# Patient Record
Sex: MALE | Race: OTHER | Employment: UNEMPLOYED | URBAN - METROPOLITAN AREA
[De-identification: names, ages, dates, MRNs, and addresses within clinical notes are randomized per-mention and may not be internally consistent; named-entity substitution may affect disease eponyms.]

---

## 2017-01-13 ENCOUNTER — CLINICAL DOCUMENTATION (OUTPATIENT)
Dept: PHYSICAL THERAPY | Age: 32
End: 2017-01-13

## 2017-04-23 ENCOUNTER — HOSPITAL ENCOUNTER (OUTPATIENT)
Dept: MRI IMAGING | Age: 32
Discharge: HOME | End: 2017-04-23
Payer: COMMERCIAL

## 2017-04-23 DIAGNOSIS — M65.332 TRIGGER MIDDLE FINGER OF LEFT HAND: ICD-10-CM

## 2017-04-23 PROCEDURE — 73218 MRI UPPER EXTREMITY W/O DYE: CPT

## 2020-09-21 ENCOUNTER — OFFICE VISIT (OUTPATIENT)
Dept: NEUROLOGY | Age: 35
End: 2020-09-21
Payer: COMMERCIAL

## 2020-09-21 VITALS — SYSTOLIC BLOOD PRESSURE: 144 MMHG | DIASTOLIC BLOOD PRESSURE: 89 MMHG | HEART RATE: 91 BPM

## 2020-09-21 DIAGNOSIS — R26.0 ATAXIC GAIT: ICD-10-CM

## 2020-09-21 PROBLEM — G89.29 CHRONIC BILATERAL THORACIC BACK PAIN: Status: ACTIVE | Noted: 2020-09-21

## 2020-09-21 PROBLEM — R20.2 PARESTHESIAS: Status: ACTIVE | Noted: 2020-09-21

## 2020-09-21 PROBLEM — R25.2 FOOT SPASMS: Status: ACTIVE | Noted: 2020-09-21

## 2020-09-21 PROBLEM — M54.6 CHRONIC BILATERAL THORACIC BACK PAIN: Status: ACTIVE | Noted: 2020-09-21

## 2020-09-21 LAB
FOLATE: >20 NG/ML (ref 7.3–26.1)
VITAMIN B-12: 435 PG/ML (ref 232–1245)

## 2020-09-21 PROCEDURE — G8421 BMI NOT CALCULATED: HCPCS | Performed by: PSYCHIATRY & NEUROLOGY

## 2020-09-21 PROCEDURE — 4004F PT TOBACCO SCREEN RCVD TLK: CPT | Performed by: PSYCHIATRY & NEUROLOGY

## 2020-09-21 PROCEDURE — G8427 DOCREV CUR MEDS BY ELIG CLIN: HCPCS | Performed by: PSYCHIATRY & NEUROLOGY

## 2020-09-21 PROCEDURE — 99204 OFFICE O/P NEW MOD 45 MIN: CPT | Performed by: PSYCHIATRY & NEUROLOGY

## 2020-09-21 RX ORDER — CYCLOBENZAPRINE HCL 10 MG
10 TABLET ORAL NIGHTLY
COMMUNITY
Start: 2020-07-29

## 2020-09-21 ASSESSMENT — ENCOUNTER SYMPTOMS
NAUSEA: 0
BACK PAIN: 1
COLOR CHANGE: 0
TROUBLE SWALLOWING: 0
VOMITING: 0
CHOKING: 0
SHORTNESS OF BREATH: 0
PHOTOPHOBIA: 0

## 2020-09-21 NOTE — PROGRESS NOTES
Not on file     Non-medical: Not on file   Tobacco Use    Smoking status: Current Every Day Smoker    Smokeless tobacco: Never Used   Substance and Sexual Activity    Alcohol use: Not on file    Drug use: Not on file    Sexual activity: Not on file   Lifestyle    Physical activity     Days per week: Not on file     Minutes per session: Not on file    Stress: Not on file   Relationships    Social connections     Talks on phone: Not on file     Gets together: Not on file     Attends Buddhism service: Not on file     Active member of club or organization: Not on file     Attends meetings of clubs or organizations: Not on file     Relationship status: Not on file    Intimate partner violence     Fear of current or ex partner: Not on file     Emotionally abused: Not on file     Physically abused: Not on file     Forced sexual activity: Not on file   Other Topics Concern    Not on file   Social History Narrative    Not on file     No family history on file. No Known Allergies    Current Outpatient Medications   Medication Sig Dispense Refill    cyclobenzaprine (FLEXERIL) 10 MG tablet Take 10 mg by mouth nightly       No current facility-administered medications for this visit. Review of Systems   Constitutional: Negative for fever. HENT: Negative for ear pain, tinnitus and trouble swallowing. Eyes: Negative for photophobia and visual disturbance. Respiratory: Negative for choking and shortness of breath. Cardiovascular: Negative for chest pain and palpitations. Gastrointestinal: Negative for nausea and vomiting. Musculoskeletal: Positive for back pain, gait problem and joint swelling. Negative for myalgias, neck pain and neck stiffness. Skin: Negative for color change. Allergic/Immunologic: Negative for food allergies. Neurological: Positive for weakness.  Negative for dizziness, tremors, seizures, syncope, facial asymmetry, speech difficulty, light-headedness, numbness and headaches. Psychiatric/Behavioral: Negative for behavioral problems, confusion, hallucinations and sleep disturbance. Objective:   BP (!) 144/89 (Site: Right Upper Arm, Position: Sitting, Cuff Size: Medium Adult)   Pulse 91     Physical Exam  Vitals signs reviewed. Eyes:      Pupils: Pupils are equal, round, and reactive to light. Neck:      Musculoskeletal: Normal range of motion. Cardiovascular:      Rate and Rhythm: Normal rate and regular rhythm. Heart sounds: No murmur. Pulmonary:      Effort: Pulmonary effort is normal.      Breath sounds: Normal breath sounds. Abdominal:      General: Bowel sounds are normal.   Musculoskeletal: Normal range of motion. Skin:     General: Skin is warm. Neurological:      Mental Status: He is alert and oriented to person, place, and time. Cranial Nerves: No cranial nerve deficit. Sensory: No sensory deficit. Motor: No abnormal muscle tone. Coordination: Coordination normal.      Deep Tendon Reflexes: Reflexes are normal and symmetric. Babinski sign absent on the right side. Babinski sign absent on the left side. Psychiatric:         Mood and Affect: Mood normal.       Patient is somewhat hyperreflexic in his lower extremity compared to upper extremity there is no clonus. He is equivocal toes. Upon standing he has some spasms. Mild proximal weakness of 4+ of 5 is notable. Mri Hand Left Wo Contrast    Result Date: 4/24/2017  EXAMINATION: MRI HAND LEFT WO CONTRAST: CLINICAL HISTORY: M65.332 TRIGGER MIDDLE FINGER OF LEFT HAND ICD10. COMPARISONS: None available. TECHNIQUE: MRI of the left hand was obtained, with specific attention to the left middle finger. A marker was placed at the site of patient's symptoms. COMPARISON: There are no X-rays available for comparison.  FINDINGS: There is a large amount of soft tissue swelling attributable to synovitis surrounding the proximal interphalangeal joint, especially along its volar surface. There is soft tissue swelling and T2 hyperintensity surrounding the flexor tendon complex at the level of the proximal interphalangeal joint. There is marrow space edema affecting the head and neck of the proximal phalanx of the left middle finger. The adjacent base of the middle phalanx is virtually unaffected. Articular surfaces appear to be intact, without evidence of disruption. There are erosions at the synovial attachment sites at the proximal phalanx head margins. There is no similar reactive marrow edema associated with other joints within the field-of-view. The extensor complex is less affected than the flexor complex, best appreciated on axial, fat saturation T2 images. There is no intrinsic abnormality of the tendon complex. There is no discernible mass. CONCLUSION: ABUNDANT INFLAMMATORY CHANGES ASSOCIATED WITH THE PROXIMAL INTERPHALANGEAL JOINT. THERE IS REACTIVE EDEMA IN THE MARROW SPACE OF THE DISTAL END OF THE PROXIMAL PHALANX OF THE MIDDLE FINGER. THE FLEXOR TENDON COMPLEX IS DISPLACED BY THE SOFT TISSUE SWELLING AND PARTIALLY ENCASED BY EDEMA. THIS WOULD BE AN UNUSUAL MANIFESTATION OF INFLAMMATORY ARTHROPATHY, SINCE REMAINING JOINTS ARE UNAFFECTED. DIFFERENTIAL DIAGNOSIS INCLUDES ATYPICAL INFLAMMATORY ARTHROPATHY. INFECTION IS UNLIKELY. INJURY IS  UNLIKELY. CORRELATION WITH X-RAY MIGHT BE HELPFUL.       No results found for: WBC, RBC, HGB, HCT, MCV, MCH, MCHC, RDW, PLT, MPV  No results found for: NA, K, CL, CO2, BUN, CREATININE, GFRAA, AGRATIO, LABGLOM, GLUCOSE, PROT, LABALBU, CALCIUM, BILITOT, ALKPHOS, AST, ALT  No results found for: PROTIME, INR  No results found for: TSH, [id number], FOLATE, FERRITIN, IRON, TIBC, PTRFSAT, TSH, FREET4  No results found for: TRIG, HDL, LDLCALC, LDLDIRECT, LABVLDL  No results found for: LABAMPH, BARBSCNU, LABBENZ, CANNAB, COCAINESCRN, LABMETH, OPIATESCREENURINE, PHENCYCLIDINESCREENURINE, PPXUR, ETOH  No results found for: LITHIUM, DILFRTOT, VALPROATE    Assessment:       Diagnosis Orders   1. Ataxic gait  MRI BRAIN W WO CONTRAST    MRI THORACIC SPINE W WO CONTRAST    MRI LUMBAR SPINE W WO CONTRAST    Vitamin B12 & Folate    Htlv I/Ii [place] Blot    Zinc    Copper, Serum    Heavy Metals, Blood   2. Paresthesias     3. Foot spasms     4. Chronic bilateral thoracic back pain     Slowly progressive gait ataxia with spasm in his lower extremities. Patient's findings are suggestive  of an upper motor neuron type of pathology and in this age group demyelination seen multiple sclerosis must be ruled out. The other etiologies to be considered his myelopathy is seen B12 deficiencies as well as HTLV-1 and HTLV 2. There is no family of spastic paraparesis in the family. Patient retains all his reflexes in fact somewhat hyperreflexic in the lower extremity and therefore a pathology in the thoracic spine was to be considered. MRI of the brain thoracic spine and lumbosacral spine strongly recommended for lower extremity weakness.     Depending on the results of the same will further advise        Plan:      Orders Placed This Encounter   Procedures    MRI BRAIN W WO CONTRAST     Standing Status:   Future     Standing Expiration Date:   9/21/2021     Order Specific Question:   Reason for exam:     Answer:   r/o MS    MRI THORACIC SPINE W WO CONTRAST     Standing Status:   Future     Standing Expiration Date:   9/21/2021     Order Specific Question:   Reason for exam:     Answer:   r/o ms    MRI LUMBAR SPINE W WO CONTRAST     Standing Status:   Future     Standing Expiration Date:   9/21/2021     Order Specific Question:   Reason for exam:     Answer:   lumbar stenoisis    Vitamin B12 & Folate     Standing Status:   Future     Standing Expiration Date:   9/21/2021    Htlv I/Ii [facility] Blot     Standing Status:   Future     Standing Expiration Date:   9/21/2021    Zinc     Standing Status:   Future     Standing Expiration Date:   9/21/2021    Copper, Serum Standing Status:   Future     Standing Expiration Date:   9/21/2021    Heavy Metals, Blood     Standing Status:   Future     Standing Expiration Date:   9/21/2021     No orders of the defined types were placed in this encounter. Return in about 3 months (around 12/21/2020).       Vibha Moscoso MD

## 2020-09-23 LAB
COPPER: 94.5 UG/DL (ref 70–140)
ZINC: 80 UG/DL (ref 60–120)

## 2020-09-24 LAB — Lab: ABNORMAL

## 2020-09-25 LAB
ARSENIC BLOOD: <10 UG/L
LEAD LEVEL BLOOD: <2 UG/DL
MERCURY BLOOD: <2.5 UG/L

## 2020-10-17 ENCOUNTER — HOSPITAL ENCOUNTER (OUTPATIENT)
Dept: MRI IMAGING | Age: 35
Discharge: HOME | End: 2020-10-19
Payer: COMMERCIAL

## 2020-10-17 PROCEDURE — 70553 MRI BRAIN STEM W/O & W/DYE: CPT

## 2020-10-17 PROCEDURE — 6360000004 HC RX CONTRAST MEDICATION: Performed by: PSYCHIATRY & NEUROLOGY

## 2020-10-17 PROCEDURE — 72157 MRI CHEST SPINE W/O & W/DYE: CPT

## 2020-10-17 PROCEDURE — A9579 GAD-BASE MR CONTRAST NOS,1ML: HCPCS | Performed by: PSYCHIATRY & NEUROLOGY

## 2020-10-17 PROCEDURE — 72158 MRI LUMBAR SPINE W/O & W/DYE: CPT

## 2020-10-17 RX ADMIN — GADOTERIDOL 15 ML: 279.3 INJECTION, SOLUTION INTRAVENOUS at 11:46

## 2020-10-19 ENCOUNTER — TELEPHONE (OUTPATIENT)
Dept: NEUROLOGY | Age: 35
End: 2020-10-19

## 2020-10-19 NOTE — TELEPHONE ENCOUNTER
Pt called due to results showing on MyChart and he was questioning what all they mean and if they are okay. Could you please let us know so we can give him a call back. Thank You.

## 2020-10-20 NOTE — TELEPHONE ENCOUNTER
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

## 2020-10-26 ENCOUNTER — TELEPHONE (OUTPATIENT)
Dept: NEUROLOGY | Age: 35
End: 2020-10-26

## 2020-10-26 NOTE — TELEPHONE ENCOUNTER
----- Message from Chas Hu MD sent at 10/25/2020  1:44 PM EDT -----  MRI of the brain cervical spine and thoracic spine are normal.  Patient does have an indeterminate HTLV-1 titers which will be repeated in 3 months.

## 2020-12-24 PROBLEM — M79.605 PAIN OF LEFT LOWER EXTREMITY: Status: ACTIVE | Noted: 2018-05-10

## 2020-12-24 PROBLEM — M79.604 PAIN OF RIGHT LOWER EXTREMITY: Status: ACTIVE | Noted: 2018-05-10

## 2020-12-30 ENCOUNTER — OFFICE VISIT (OUTPATIENT)
Dept: NEUROLOGY | Age: 35
End: 2020-12-30
Payer: COMMERCIAL

## 2020-12-30 VITALS — DIASTOLIC BLOOD PRESSURE: 88 MMHG | HEART RATE: 87 BPM | SYSTOLIC BLOOD PRESSURE: 136 MMHG

## 2020-12-30 DIAGNOSIS — Z22.6 HTLV-1 CARRIER: ICD-10-CM

## 2020-12-30 PROCEDURE — G8421 BMI NOT CALCULATED: HCPCS | Performed by: PSYCHIATRY & NEUROLOGY

## 2020-12-30 PROCEDURE — 99214 OFFICE O/P EST MOD 30 MIN: CPT | Performed by: PSYCHIATRY & NEUROLOGY

## 2020-12-30 PROCEDURE — G8484 FLU IMMUNIZE NO ADMIN: HCPCS | Performed by: PSYCHIATRY & NEUROLOGY

## 2020-12-30 PROCEDURE — G8427 DOCREV CUR MEDS BY ELIG CLIN: HCPCS | Performed by: PSYCHIATRY & NEUROLOGY

## 2020-12-30 PROCEDURE — 4004F PT TOBACCO SCREEN RCVD TLK: CPT | Performed by: PSYCHIATRY & NEUROLOGY

## 2020-12-30 ASSESSMENT — ENCOUNTER SYMPTOMS
SHORTNESS OF BREATH: 0
CHOKING: 0
TROUBLE SWALLOWING: 0
BACK PAIN: 1
PHOTOPHOBIA: 0
VOMITING: 0
NAUSEA: 0
COLOR CHANGE: 0

## 2020-12-30 NOTE — PROGRESS NOTES
Subjective:      Patient ID: June Del Toro is a 38 y.o. male who presents today for:  Chief Complaint   Patient presents with    Follow-up     PT states things aren't great still pretty much going the same as before.  Results     PT Had blood work and an MRI would like to go over results today. HPI 58-year-old right-handed female with a history of lower extremity weakness going on for 2 years. Patient has slowly worsened over time with spasms and paresthesias. Patient did not complain of any bladder dysfunction. Is walking on crutches for the past 2 years. His MRI of the brain thoracic and lumbosacral spine are normal.  His HTLV-1 and HTLV 2 shows indeterminate finding all the other findings are normal.  Truly has spasticity in the lower extremities hyperreflexic in lower extremity compared to the upper extremity. he still walking with crutches. No past medical history on file. No past surgical history on file.   Social History     Socioeconomic History    Marital status:      Spouse name: Not on file    Number of children: Not on file    Years of education: Not on file    Highest education level: Not on file   Occupational History    Not on file   Social Needs    Financial resource strain: Not on file    Food insecurity     Worry: Not on file     Inability: Not on file    Transportation needs     Medical: Not on file     Non-medical: Not on file   Tobacco Use    Smoking status: Current Every Day Smoker    Smokeless tobacco: Never Used   Substance and Sexual Activity    Alcohol use: Not on file    Drug use: Not on file    Sexual activity: Not on file   Lifestyle    Physical activity     Days per week: Not on file     Minutes per session: Not on file    Stress: Not on file   Relationships    Social connections     Talks on phone: Not on file     Gets together: Not on file     Attends Religion service: Not on file     Active member of club or organization: Not on file Attends meetings of clubs or organizations: Not on file     Relationship status: Not on file    Intimate partner violence     Fear of current or ex partner: Not on file     Emotionally abused: Not on file     Physically abused: Not on file     Forced sexual activity: Not on file   Other Topics Concern    Not on file   Social History Narrative    Not on file     No family history on file. No Known Allergies    Current Outpatient Medications   Medication Sig Dispense Refill    cyclobenzaprine (FLEXERIL) 10 MG tablet Take 10 mg by mouth nightly       No current facility-administered medications for this visit. Review of Systems   Constitutional: Negative for fever. HENT: Negative for ear pain, tinnitus and trouble swallowing. Eyes: Negative for photophobia and visual disturbance. Respiratory: Negative for choking and shortness of breath. Cardiovascular: Negative for chest pain and palpitations. Gastrointestinal: Negative for nausea and vomiting. Musculoskeletal: Positive for arthralgias and back pain. Negative for gait problem, joint swelling, myalgias, neck pain and neck stiffness. Skin: Negative for color change. Allergic/Immunologic: Negative for food allergies. Neurological: Positive for weakness. Negative for dizziness, tremors, seizures, syncope, facial asymmetry, speech difficulty, light-headedness, numbness and headaches. Psychiatric/Behavioral: Negative for behavioral problems, confusion, hallucinations and sleep disturbance. Objective:   /88 (Site: Left Upper Arm, Position: Sitting, Cuff Size: Medium Adult)   Pulse 87     Physical Exam  Vitals signs reviewed. Eyes:      Pupils: Pupils are equal, round, and reactive to light. Neck:      Musculoskeletal: Normal range of motion. Cardiovascular:      Rate and Rhythm: Normal rate and regular rhythm. Heart sounds: No murmur.    Pulmonary:      Effort: Pulmonary effort is normal.      Breath sounds: Normal breath sounds. Abdominal:      General: Bowel sounds are normal.   Musculoskeletal: Normal range of motion. Skin:     General: Skin is warm. Neurological:      Mental Status: He is alert and oriented to person, place, and time. Cranial Nerves: No cranial nerve deficit. Sensory: No sensory deficit. Motor: No abnormal muscle tone. Coordination: Coordination normal.      Deep Tendon Reflexes: Babinski sign absent on the right side. Babinski sign absent on the left side. Reflex Scores:       Tricep reflexes are 2+ on the right side and 2+ on the left side. Bicep reflexes are 2+ on the right side and 2+ on the left side. Brachioradialis reflexes are 2+ on the right side and 2+ on the left side. Patellar reflexes are 3+ on the right side and 3+ on the left side. Achilles reflexes are 3+ on the right side and 3+ on the left side. Comments: Patient this patient has weakness in the lower extremity of 4+ of 5 but probably definitely has hyperreflexia in the lower extremity compared to the upper extremities and walks with the crutches. Psychiatric:         Mood and Affect: Mood normal.         Mri Thoracic Spine W Wo Contrast    Result Date: 10/19/2020  MRI LUMBAR AND THORACIC SPINE WITHOUT AND WITH CONTRAST HISTORY: Ataxic gait COMPARISON: No prior imaging of the lumbar spine available for correlation. TECHNIQUE: Sagittal T1, T2, and inversion recovery. Axial T1 and T2. Sagittal and axial T1 post IV gadolinium with fat suppression (15 cc of ProHance). Sagittal T1 whole spine localizer. 3 plane T2 lumbar localizer. 3 plane gradient echo thoracic localized. FINDINGS: LUMBAR SPINE. Axial image quality limited by motion. For the purposes of enumerating the lumbar disc levels the lowest lumbar-type disc will be referred to as L5-S1. Vertebral body heights and alignment is maintained. No spondylolysis.  15 mm rounded well-defined focus of increased T1, T2, and inversion recovery signal, having mild postcontrast enhancement compatible with hemangioma. No pathologic marrow replacement. Conus medullaris is unremarkable terminating just above the L1-2 disc. No abnormal intraspinal enhancement. T11-12 through the L5-S1 discs have no significant degenerative changes with well-maintained height and degree of hydration. L3-4 and L4-5 disc levels have a canal at the lower limits of normal in size more pronounced at the former. This is considered on a congenital basis accentuated by slight loss of the posterior disc concavity and minimal ligamentous hypertrophy. No foraminal stenosis. L5-S1 disc subtle eccentric towards the right bulging without significant mass effect on the thecal sac. Liver is partly included on the localizer and the portions visualized measure over 19 cm in craniocaudal, either hepatomegaly or Riedel's lobe. IMPRESSION (lumbar spine): Essentially unremarkable MRI of the lumbar spine. L3-4 and to lesser extent L4-5 minimal canal narrowing predominantly on a congenital basis. THORACIC SPINE: Axial image quality is limited by motion. Vertebral body heights and alignment is maintained. No compression fractures or pathologic marrow replacement. No abnormal vertebral or intraspinal enhancement. Thoracic cord has a normal signal and morphology. T6-7 mild disc space narrowing and desiccation with a tiny central shallow focal disc protrusion causing minimal mass effect fact on the thecal sac. T8-7 trivial left subarticular shallow bulging. No canal or foraminal stenosis at these levels or elsewhere. T4-5 mild and minimal T3-4 anterior endplate spurring. Lower cervical cord apparent increased signal on the inferior edge of the sagittal T2 sequence. No axial images through this region on thoracic spine exam. This could represent partial volume averaging with cerebrospinal fluid lateral to the cord or true  signal abnormality.  Cervical cord to the mid C3 level is visible on MRI of the brain and unremarkable. Right lung apex on the axial T2 sequence 7.7 x 5.3 mm soft tissue signal focus. Differential includes partial volume averaging of the superior pleura as this is on the most superior image, nodule or artifact. Likely artifact lateral left apex. IMPRESSION (thoracic spine): Essentially unremarkable MRI of the thoracic spine. Inferior cervical cord apparent increased signal detailed above. Correlate with cervical spine MRI. Right lung apex nodule, partial volume averaging or artifact. Correlate with CT chest.    Mri Lumbar Spine W Wo Contrast    Result Date: 10/19/2020  MRI LUMBAR AND THORACIC SPINE WITHOUT AND WITH CONTRAST HISTORY: Ataxic gait COMPARISON: No prior imaging of the lumbar spine available for correlation. TECHNIQUE: Sagittal T1, T2, and inversion recovery. Axial T1 and T2. Sagittal and axial T1 post IV gadolinium with fat suppression (15 cc of ProHance). Sagittal T1 whole spine localizer. 3 plane T2 lumbar localizer. 3 plane gradient echo thoracic localized. FINDINGS: LUMBAR SPINE. Axial image quality limited by motion. For the purposes of enumerating the lumbar disc levels the lowest lumbar-type disc will be referred to as L5-S1. Vertebral body heights and alignment is maintained. No spondylolysis. 15 mm rounded well-defined focus of increased T1, T2, and inversion recovery signal, having mild postcontrast enhancement compatible with hemangioma. No pathologic marrow replacement. Conus medullaris is unremarkable terminating just above the L1-2 disc. No abnormal intraspinal enhancement. T11-12 through the L5-S1 discs have no significant degenerative changes with well-maintained height and degree of hydration. L3-4 and L4-5 disc levels have a canal at the lower limits of normal in size more pronounced at the former. This is considered on a congenital basis accentuated by slight loss of the posterior disc concavity and minimal ligamentous hypertrophy. No foraminal stenosis.  L5-S1 with IV contrast. HISTORY: Ataxic gait. COMPARISON: No prior imaging of the brain available for correlation. TECHNIQUE: Sagittal T1. Axial T1, T2, susceptibility weighted and diffusion-weighted images. Coronal T2. Sagittal axial and coronal thin section FLAIR. Axial and coronal T1 after the IV administration of 15 cc of gadolinium (ProHance). Thin section 3-D spoiled gradient post gadolinium with coronal and sagittal reconstructions. FINDINGS: Brain has a normal morphology with no mass, extra-axial collections or abnormal postcontrast enhancement. Subtle nonspecific white matter increased long TR sequence signal adjacent to the occipital and frontal horns is a common finding that may represent a normal histologic variant (e.g. ependymitis granularis adjacent to the frontal horns) or age-related. No abnormal signal typical for primary demyelinating disease. No significant atrophy or ventricular dilatation. Specifically no cerebellar atrophy. No restricted diffusion to indicate acute infarct. No encephalomalacia to indicate remote infarcts. No pathologic mineralization on the susceptibility weighted images. Orbits, pituitary gland, internal auditory canals, and cerebellopontine angles are unremarkable. No cerebellar tonsillar ectopia. Cervical cord to the midportion of the C3 vertebra is included on the sagittal FLAIR and has a normal signal and morphology. Mild to moderate bilateral ethmoid sinus mucosal thickening. Mild frontal and maxillary sinus mucosal thickening. No air-fluid levels to indicate acute sinusitis. Subtle faint nonspecific mucosal thickening in the mastoid sinuses without fluid bright signal.     Essentially unremarkable MRI of the brain. Mild chronic paranasal sinus mucosal thickening most pronounced in the ethmoids.       No results found for: WBC, RBC, HGB, HCT, MCV, MCH, MCHC, RDW, PLT, MPV  No results found for: NA, K, CL, CO2, BUN, CREATININE, GFRAA, AGRATIO, LABGLOM, GLUCOSE, PROT, LABALBU, CALCIUM, BILITOT, ALKPHOS, AST, ALT  No results found for: PROTIME, INR  Lab Results   Component Value Date    [id number] 656 09/21/2020    FOLATE >20.0 09/21/2020     No results found for: TRIG, HDL, LDLCALC, LDLDIRECT, LABVLDL  No results found for: LABAMPH, BARBSCNU, LABBENZ, CANNAB, COCAINESCRN, LABMETH, OPIATESCREENURINE, PHENCYCLIDINESCREENURINE, PPXUR, ETOH  No results found for: LITHIUM, DILFRTOT, VALPROATE    Assessment:       Diagnosis Orders   1. 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 Paresthesias  EMG   4. Foot spasms     Gait ataxia with lower extremity spasticity and weakness. MRI of the brain and thoracic and lumbosacral spine are normal.  Patient symptoms appear to be lower extremity with spasticity and increased reflexes. Definite is a upper motor neuron type of findings. We did obtain his HTLV-1 levels and they are indeterminate we need to repeat this with antibody titers. This is the first differential diagnosis. The etiology is to be considered are spinal muscular atrophy and therefore we will arrange for an EMG nerve conduction study to see if there is denervation in the face of hyper reflexia at this time they qualify for SMA which now has a treatment    On rare occasions and NMO  spectral disorders seen in multiple sclerosis is still a consideration without white matter disease we will consider lumbar puncture at some point. We will arrange for VDRL studies as well as Lyme titers.   He does not have B12 myelopathy          Plan:      Orders Placed This Encounter   Procedures    Htlv I/Ii [place] Blot     Standing Status:   Future     Standing Expiration Date:   12/30/2021    Htlv-I/Ii Antibodies With Confirm     Standing Status:   Future     Standing Expiration Date:   12/30/2021    Vdrl W Reflex To Titer     Standing Status:   Future     Standing Expiration Date: 12/30/2021    Lyme Disease Acute Reflexive Panel     Standing Status:   Future     Standing Expiration Date:   12/30/2021    EMG     Standing Status:   Future     Standing Expiration Date:   2/28/2021     Order Specific Question:   Which body part? Answer:   lower/  pn     No orders of the defined types were placed in this encounter. Return in about 3 months (around 3/30/2021).       Eladia El MD

## 2020-12-31 LAB — HTLV I/II AB: NEGATIVE

## 2021-01-01 LAB
LYME, EIA: 0.45 LIV (ref 0–1.2)
T. PALLIDIUM  SERUM (VDRL): NON REACTIVE

## 2021-01-27 ENCOUNTER — HOSPITAL ENCOUNTER (OUTPATIENT)
Dept: NEUROLOGY | Age: 36
Discharge: HOME | End: 2021-01-27
Payer: COMMERCIAL

## 2021-01-27 DIAGNOSIS — R20.2 PARESTHESIAS: ICD-10-CM

## 2021-01-27 PROCEDURE — 95912 NRV CNDJ TEST 11-12 STUDIES: CPT

## 2021-01-27 PROCEDURE — 95912 NRV CNDJ TEST 11-12 STUDIES: CPT | Performed by: PSYCHIATRY & NEUROLOGY

## 2021-01-27 PROCEDURE — 95886 MUSC TEST DONE W/N TEST COMP: CPT

## 2021-01-27 PROCEDURE — 95886 MUSC TEST DONE W/N TEST COMP: CPT | Performed by: PSYCHIATRY & NEUROLOGY

## 2021-01-27 NOTE — PROCEDURES
[address]                      [facility], [address] [facility]                             ELECTROMYOGRAM REPORT    PATIENT NAME: Alonzo Walsh                       :        1985  MED REC NO:   [id number]                            ROOM:  ACCOUNT NO:   [de-identified]                           ADMIT DATE: 2021  PROVIDER:     Governor Milton MD    DATE OF EM2021    Neurophysiological studies are requested for the patient's lower  extremity weakness and spasticity. Motor nerve conduction study of the right common peroneal nerve shows  normal amplitudes, latencies, and conduction velocity. Motor nerve  conduction study of the left common peroneal nerve shows normal  amplitudes, latencies, and conduction velocity. Motor nerve conduction  study of the right tibial nerve shows normal amplitudes, latencies, and  conduction velocity. Motor nerve conduction study of the left tibial  nerve shows normal amplitudes, latencies, and conduction velocity. Right sural nerve conduction study shows normal peak latencies, normal  amplitudes, and normal conduction velocity. Left sural nerve conduction  study shows normal peak latencies, normal amplitudes, and normal  conduction velocity. Right superficial peroneal nerve shows normal  amplitudes, latencies, and conduction velocity. Left superficial  peroneal shows normal amplitudes, latencies, and conduction velocity. Right saphenous nerve shows normal peak latencies, normal amplitudes,  and normal conduction velocity. Left saphenous nerve shows normal peak  latencies, low amplitudes, and normal conduction velocity. Needle  electrode examination is entirely normal.  The quadriceps muscle on the  right cannot be examined due to fixed knee. Needle electrode examination otherwise is normal.  F-responses are  normal.  H-responses are normal.    IMPRESSION:  Normal nerve conduction studies of the lower extremity.

## 2021-03-31 ENCOUNTER — OFFICE VISIT (OUTPATIENT)
Dept: NEUROLOGY | Age: 36
End: 2021-03-31
Payer: COMMERCIAL

## 2021-03-31 VITALS — DIASTOLIC BLOOD PRESSURE: 73 MMHG | HEART RATE: 86 BPM | SYSTOLIC BLOOD PRESSURE: 133 MMHG

## 2021-03-31 DIAGNOSIS — R25.2 SPASTICITY: ICD-10-CM

## 2021-03-31 DIAGNOSIS — R26.0 ATAXIC GAIT: ICD-10-CM

## 2021-03-31 DIAGNOSIS — G24.8 LIMB DYSTONIA: ICD-10-CM

## 2021-03-31 DIAGNOSIS — G89.29 CHRONIC RIGHT-SIDED LOW BACK PAIN WITH RIGHT-SIDED SCIATICA: ICD-10-CM

## 2021-03-31 DIAGNOSIS — M54.41 CHRONIC RIGHT-SIDED LOW BACK PAIN WITH RIGHT-SIDED SCIATICA: ICD-10-CM

## 2021-03-31 DIAGNOSIS — R25.2 FOOT SPASMS: ICD-10-CM

## 2021-03-31 DIAGNOSIS — G82.20 PARAPARESIS, BILATERAL (HCC): Primary | ICD-10-CM

## 2021-03-31 PROCEDURE — G8427 DOCREV CUR MEDS BY ELIG CLIN: HCPCS | Performed by: PSYCHIATRY & NEUROLOGY

## 2021-03-31 PROCEDURE — G8421 BMI NOT CALCULATED: HCPCS | Performed by: PSYCHIATRY & NEUROLOGY

## 2021-03-31 PROCEDURE — 4004F PT TOBACCO SCREEN RCVD TLK: CPT | Performed by: PSYCHIATRY & NEUROLOGY

## 2021-03-31 PROCEDURE — G8484 FLU IMMUNIZE NO ADMIN: HCPCS | Performed by: PSYCHIATRY & NEUROLOGY

## 2021-03-31 PROCEDURE — 99214 OFFICE O/P EST MOD 30 MIN: CPT | Performed by: PSYCHIATRY & NEUROLOGY

## 2021-03-31 ASSESSMENT — ENCOUNTER SYMPTOMS
CHOKING: 0
NAUSEA: 0
SHORTNESS OF BREATH: 0
PHOTOPHOBIA: 0
TROUBLE SWALLOWING: 0
VOMITING: 0
COLOR CHANGE: 0
BACK PAIN: 0

## 2021-03-31 NOTE — PROGRESS NOTES
Subjective:      Patient ID: Jony Barnes is a 38 y.o. male who presents today for:  Chief Complaint   Patient presents with    Follow-up     PT states that everything is the same. He states that he would like to go over results from the emg today. HPI 69-year-old right-handed gentleman who was here for additional lower extremity weakness going on for 2 years. Patient slowly progressive weakness. He had no symptoms in his upper extremity. He was diagnosed with rheumatic fever in 2013 with knee issues. We had further embarked upon multiple evaluations which are completed. Initial H DL B1 was indeterminate we are further repeated this with antibodies and these are negative this does not therefore suggest a HTLV 1 or 2 spastic paraparesis. Patient is Lyme titers are negative as well. Patient's MRI of the brain thoracic and lumbar spine are normal.  he still has spasticity in the lower extremity and walks with crutches. EMG nerve conduction studies are normal there was some question about spinal muscular atrophy. Findings did not suggest this though. 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 The other differential diagnosis would have been an MO spectrum disorders though he has no evidence of any white matter disease anywhere else. Patient's heavy metal screen also is negative. She has considerable hamstring spasm due to his knee pain from previous knee infections. He is walking on crutches      No past medical history on file. No past surgical history on file.   Social History     Socioeconomic History    Marital status:      Spouse name: Not on file    Number of children: Not on file    Years of education: Not on file    Highest education level: Not on file   Occupational History    Not on file   Social Needs    Financial resource strain: Not on file  Food insecurity     Worry: Not on file     Inability: Not on file    Transportation needs     Medical: Not on file     Non-medical: Not on file   Tobacco Use    Smoking status: Current Every Day Smoker    Smokeless tobacco: Never Used   Substance and Sexual Activity    Alcohol use: Not on file    Drug use: Not on file    Sexual activity: Not on file   Lifestyle    Physical activity     Days per week: Not on file     Minutes per session: Not on file    Stress: Not on file   Relationships    Social connections     Talks on phone: Not on file     Gets together: Not on file     Attends [employer] service: Not on file     Active member of club or organization: Not on file     Attends meetings of clubs or organizations: Not on file     Relationship status: Not on file    Intimate partner violence     Fear of current or ex partner: Not on file     Emotionally abused: Not on file     Physically abused: Not on file     Forced sexual activity: Not on file   Other Topics Concern    Not on file   Social History Narrative    Not on file     No family history on file. No Known Allergies    Current Outpatient Medications   Medication Sig Dispense Refill    cyclobenzaprine (FLEXERIL) 10 MG tablet Take 10 mg by mouth nightly       No current facility-administered medications for this visit. Review of Systems   Constitutional: Negative for fever. HENT: Negative for ear pain, tinnitus and trouble swallowing. Eyes: Negative for photophobia and visual disturbance. Respiratory: Negative for choking and shortness of breath. Cardiovascular: Negative for chest pain and palpitations. Gastrointestinal: Negative for nausea and vomiting. Musculoskeletal: Negative for back pain, gait problem, joint swelling, myalgias, neck pain and neck stiffness. Skin: Negative for color change. Allergic/Immunologic: Negative for food allergies.    Neurological: Negative for dizziness, tremors, seizures, syncope, facial asymmetry, speech difficulty, weakness, light-headedness, numbness and headaches. Psychiatric/Behavioral: Negative for behavioral problems, confusion, hallucinations and sleep disturbance. Objective:   /73 (Site: Left Upper Arm, Position: Sitting, Cuff Size: Medium Adult)   Pulse 86     Physical Exam  Vitals signs reviewed. Eyes:      Pupils: Pupils are equal, round, and reactive to light. Neck:      Musculoskeletal: Normal range of motion. Cardiovascular:      Rate and Rhythm: Normal rate and regular rhythm. Heart sounds: No murmur. Pulmonary:      Effort: Pulmonary effort is normal.      Breath sounds: Normal breath sounds. Abdominal:      General: Bowel sounds are normal.   Musculoskeletal: Normal range of motion. Skin:     General: Skin is warm. Neurological:      Mental Status: He is alert and oriented to person, place, and time. Cranial Nerves: No cranial nerve deficit. Sensory: No sensory deficit. Motor: No abnormal muscle tone. Coordination: Coordination normal.      Deep Tendon Reflexes: Reflexes are normal and symmetric. Babinski sign absent on the right side. Babinski sign absent on the left side. Psychiatric:         Mood and Affect: Mood normal.         No results found. No results found for: WBC, RBC, HGB, HCT, MCV, MCH, MCHC, RDW, PLT, MPV  No results found for: NA, K, CL, CO2, BUN, CREATININE, GFRAA, AGRATIO, LABGLOM, GLUCOSE, PROT, LABALBU, CALCIUM, BILITOT, ALKPHOS, AST, ALT  No results found for: PROTIME, INR  Lab Results   Component Value Date    [id number] 09/21/2020    FOLATE >20.0 09/21/2020     No results found for: TRIG, HDL, LDLCALC, LDLDIRECT, LABVLDL  No results found for: Rizwana Flicker, LABBENZ, CANNAB, COCAINESCRN, LABMETH, OPIATESCREENURINE, PHENCYCLIDINESCREENURINE, PPXUR, ETOH  No results found for: LITHIUM, DILFRTOT, VALPROATE    Assessment:       Diagnosis Orders   1. Paraparesis, bilateral ([address]     2. Chronic right-sided low back pain with right-sided sciatica     3. Ataxic gait     4. Foot spasms     5. Spasticity     Paraparesis with extensive work-up. Patient has some minor spasticity in lower extremity and therefore we embarked upon a complete evaluation including MRI of the lumbar spine thoracic spine MRI of the brain EMG and multiple labs his initial HTLV-1 came back as partially positive repeated this is negative and this test therefore may not suggest spastic paraparesis. I truly feel all his symptoms are related to his knee and he has spasms in his hamstrings we will treat this with 20 units about amatoxin to see if he can relieve his pain and spasms which may help his therapy. He had any infection which is left him with all this and underlying reflex intact dystrophy is a consideration. Patient has back issues but his MRI lumbar spine did not show anything significant. We are only short of her muscle biopsy and we had discussed this if we have to do this though his muscle parameters are all normal. This therefore is in unidentified problem secondary to us knee problem and I do not think that this is spinal muscular atrophy given his findings from the EMG as well. 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 No orders of the defined types were placed in this encounter. No follow-ups on file.       Chris Cohen MD

## 2021-04-06 ENCOUNTER — TELEPHONE (OUTPATIENT)
Dept: NEUROLOGY | Age: 36
End: 2021-04-06

## 2021-04-06 DIAGNOSIS — G24.8 TORSION DYSTONIA, ACQUIRED: Primary | ICD-10-CM

## 2021-04-06 NOTE — TELEPHONE ENCOUNTER
Botox approved and scheduled for 05/05/21 at 1015 am at [facility]. Mailbox full. Order pended. Please sign.

## 2021-04-06 NOTE — TELEPHONE ENCOUNTER
PT called back and was informed of approval and appointment time. He asked that if there are any cancellations that he could get in sooner to please let him know.

## 2021-05-05 ENCOUNTER — HOSPITAL ENCOUNTER (OUTPATIENT)
Dept: NEUROLOGY | Age: 36
Discharge: HOME | End: 2021-05-05
Payer: COMMERCIAL

## 2021-05-05 PROCEDURE — 6360000002 HC RX W HCPCS

## 2021-05-05 PROCEDURE — 95874 GUIDE NERV DESTR NEEDLE EMG: CPT

## 2021-05-05 PROCEDURE — 64642 CHEMODENERV 1 EXTREMITY 1-4: CPT

## 2021-05-05 PROCEDURE — 95874 GUIDE NERV DESTR NEEDLE EMG: CPT | Performed by: PSYCHIATRY & NEUROLOGY

## 2021-05-05 PROCEDURE — 64644 CHEMODENERV 1 EXTREM 5/> MUS: CPT | Performed by: PSYCHIATRY & NEUROLOGY

## 2021-05-14 ENCOUNTER — TELEPHONE (OUTPATIENT)
Dept: NEUROLOGY | Age: 36
End: 2021-05-14

## 2021-05-14 NOTE — TELEPHONE ENCOUNTER
Msg left for patient to call office to advise next Botox scheduled for 08/09/21 at 930 am at [facility].

## 2021-05-19 ENCOUNTER — TELEPHONE (OUTPATIENT)
Dept: NEUROLOGY | Age: 36
End: 2021-05-19

## 2021-05-19 DIAGNOSIS — G24.8 TORSION DYSTONIA, ACQUIRED: Primary | ICD-10-CM

## 2021-08-20 PROBLEM — R25.2 FOOT SPASMS: Status: ACTIVE | Noted: 2021-08-20
